# Patient Record
Sex: MALE | Race: WHITE | NOT HISPANIC OR LATINO | Employment: UNEMPLOYED | URBAN - METROPOLITAN AREA
[De-identification: names, ages, dates, MRNs, and addresses within clinical notes are randomized per-mention and may not be internally consistent; named-entity substitution may affect disease eponyms.]

---
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
## 2025-01-02 PROCEDURE — RXMED WILLOW AMBULATORY MEDICATION CHARGE

## 2025-01-03 ENCOUNTER — PHARMACY VISIT (OUTPATIENT)
Dept: PHARMACY | Facility: CLINIC | Age: 20
End: 2025-01-03
Payer: COMMERCIAL

## 2025-01-08 PROCEDURE — RXMED WILLOW AMBULATORY MEDICATION CHARGE

## 2025-01-10 ENCOUNTER — PHARMACY VISIT (OUTPATIENT)
Dept: PHARMACY | Facility: CLINIC | Age: 20
End: 2025-01-10
Payer: COMMERCIAL

## 2025-01-10 ENCOUNTER — APPOINTMENT (OUTPATIENT)
Dept: PEDIATRIC ENDOCRINOLOGY | Facility: CLINIC | Age: 20
End: 2025-01-10
Payer: COMMERCIAL

## 2025-01-10 VITALS
HEIGHT: 70 IN | WEIGHT: 268.74 LBS | TEMPERATURE: 96.9 F | DIASTOLIC BLOOD PRESSURE: 82 MMHG | SYSTOLIC BLOOD PRESSURE: 132 MMHG | BODY MASS INDEX: 38.47 KG/M2 | HEART RATE: 109 BPM

## 2025-01-10 DIAGNOSIS — E11.9 TYPE 2 DIABETES, HBA1C GOAL < 7% (MULTI): ICD-10-CM

## 2025-01-10 LAB — POC HEMOGLOBIN A1C: 7.8 % (ref 4.2–6.5)

## 2025-01-10 PROCEDURE — 99215 OFFICE O/P EST HI 40 MIN: CPT | Performed by: PEDIATRICS

## 2025-01-10 PROCEDURE — 3079F DIAST BP 80-89 MM HG: CPT | Performed by: PEDIATRICS

## 2025-01-10 PROCEDURE — 3008F BODY MASS INDEX DOCD: CPT | Performed by: PEDIATRICS

## 2025-01-10 PROCEDURE — 83036 HEMOGLOBIN GLYCOSYLATED A1C: CPT | Performed by: PEDIATRICS

## 2025-01-10 PROCEDURE — 3075F SYST BP GE 130 - 139MM HG: CPT | Performed by: PEDIATRICS

## 2025-01-10 RX ORDER — TIRZEPATIDE 10 MG/.5ML
10 INJECTION, SOLUTION SUBCUTANEOUS
Qty: 2 ML | Refills: 3 | Status: SHIPPED | OUTPATIENT
Start: 2025-01-10 | End: 2025-01-10 | Stop reason: ALTCHOICE

## 2025-01-10 NOTE — PATIENT INSTRUCTIONS
It was nice to see you today Bartolo, A1C is 7.8%, down from 10.2% - great job!    PLAN:  -STOP insulin  -Continue Mounjaro 7.5mg  - Restart Jardiance  -work on increasing exercise  -due for labs  -Please call the office if BG is consistently above 180 or below 80  - USE [employer] Billing Phone Number: [phone]    Follow up in 3 months    Pediatric Endocrinology Office Info:  Mon-Fri 8:30am-5pm: [phone]  After 5pm, weekends, holidays: [phone]  [email or web address]    Please do not send MyChart Messages for urgent matters

## 2025-01-10 NOTE — PROGRESS NOTES
[facility]  Pediatric Diabetes Center    Subjective   Bartolo Connolly Jr. is a 19 y.o. male with type 2 diabetes.   Today Bartolo presents to clinic with his mother.     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 nurse, wants to work with computers after graduating  -lives at home with mom and brother     Insulin Injections/Pump sites:   - Not giving mealtime insulin  - Site rotation: stomach     Carbohydrate counting/diet:   - Not counting  - BF: typically skips, some mornings have coffee with cream and sugar  - Lunch: sandwich, noodles  - Dinner: whatever is at the house, chicken, porkchops  - Mostly drinking water or diet pop     Other:   Hypoglycemia:  - Not having any lows that are needing to be treated  -If he feels low, will eat something  Checks ketones with: BG high     Exercise:   -not doing any exercise, watching kids and chasing after them     Education Reviewed:   -exercise, insulin, jardiance     Goals        Help patients manage type 2 diabetes      Work on increasing exercise amount, at least 30-60 minutes of moderate exercise that makes you sweat!              Diabetes  Date of Diabetes Diagnosis: 03/01/20  Type of Diabetes: Type 2    Insulin Delivery  Diabetes Management Regimen: MDI  Using Smart Pen device: No  Average number of bolus insulin doses per day: 0    Glucose Monitoring  How do you primarily monitor blood sugars?: Meter  Glucose average  "(mg/dL): 174  Average number of blood glucose checks per day: 0.6    Clinical Details  Hypoglycemia Unawareness : No  Severe Hypoglycemia (coma, seizure, disorientation, or the need for high dose glucagon) since last visit: No    Hospitalizations (since last endocrine appointment)  ED/Hospitalizations related to Diabetes: No  ED/Hospitalization not related to Diabetes: No  ED/Hospitalization related to DKA: No    Education  Comprehensive Diabetes Education : 03/01/20    Screens  Eye Exam:  (scheduled in April)  Labs:  (DUE)  Flu Shot: Not Applicable  Depression Screen: Yes  Depression Screen Date: 09/30/24  Counseling: Not applicable         Review of Systems   All other systems reviewed and are negative.      Objective   /82 (BP Location: Right arm, Patient Position: Sitting, BP Cuff Size: Large adult)   Pulse 109   Temp 36.1 °C (96.9 °F) (Temporal)   Ht 1.786 m (5' 10.32\")   Wt 122 kg (268 lb 11.9 oz)   BMI 38.22 kg/m²      Physical Exam     Lab  Lab Results   Component Value Date    HGBA1C 7.8 (A) 01/10/2025    HGBA1C 10.2 (A) 09/30/2024    HGBA1C 9.9 (A) 07/22/2024    HGBA1C 11.2 (A) 05/21/2024       Assessment/Plan     started taking Jardiance after last appt, took for a couple weeks and having lows so they stopped the Jardiance      Patient Instructions   It was nice to see you today Bartolo, A1C is 7.8%, down from 10.2% - great job!    PLAN:  -STOP insulin  -Continue Mounjaro 7.5mg  - Restart Jardiance  -work on increasing exercise  -due for labs  -Please call the office if BG is consistently above 180 or below 80     Billing Phone Number: [phone]    Follow up in 3 months    Pediatric Endocrinology Office Info:  Mon-Fri 8:30am-5pm: [phone]  After 5pm, weekends, holidays: [phone]  [email or web address]    Please do not send MyChart Messages for urgent matters           Insulin Instructions  Fixed Dose Injections   Last edited by Chaparrita Villareal RN on 11/27/2023 at 11:00 AM    "   Time of Day Dose (units)   8am 60     Mealtime Injections   insulin lispro 100 unit/mL injection (HumaLOG)   Last edited by Chaparrita Villareal RN on 7/22/2024 at 12:19 PM      If not eating and BG>200, take 5 units  If eating, take 10 units         Chaparrita Villareal RN

## 2025-01-13 PROCEDURE — RXMED WILLOW AMBULATORY MEDICATION CHARGE

## 2025-01-13 RX ORDER — TIRZEPATIDE 7.5 MG/.5ML
7.5 INJECTION, SOLUTION SUBCUTANEOUS
Qty: 2 ML | Refills: 3 | Status: SHIPPED | OUTPATIENT
Start: 2025-01-13

## 2025-01-17 ENCOUNTER — PHARMACY VISIT (OUTPATIENT)
Dept: PHARMACY | Facility: CLINIC | Age: 20
End: 2025-01-17
Payer: COMMERCIAL

## 2025-01-24 PROCEDURE — RXMED WILLOW AMBULATORY MEDICATION CHARGE

## 2025-01-27 PROCEDURE — RXMED WILLOW AMBULATORY MEDICATION CHARGE

## 2025-01-28 ENCOUNTER — PHARMACY VISIT (OUTPATIENT)
Dept: PHARMACY | Facility: CLINIC | Age: 20
End: 2025-01-28
Payer: COMMERCIAL

## 2025-01-29 PROCEDURE — RXMED WILLOW AMBULATORY MEDICATION CHARGE

## 2025-01-30 ENCOUNTER — PHARMACY VISIT (OUTPATIENT)
Dept: PHARMACY | Facility: CLINIC | Age: 20
End: 2025-01-30
Payer: COMMERCIAL

## 2025-02-13 PROCEDURE — RXMED WILLOW AMBULATORY MEDICATION CHARGE

## 2025-02-17 ENCOUNTER — PHARMACY VISIT (OUTPATIENT)
Dept: PHARMACY | Facility: CLINIC | Age: 20
End: 2025-02-17
Payer: COMMERCIAL

## 2025-02-18 ENCOUNTER — PHARMACY VISIT (OUTPATIENT)
Dept: PHARMACY | Facility: CLINIC | Age: 20
End: 2025-02-18

## 2025-02-25 PROCEDURE — RXMED WILLOW AMBULATORY MEDICATION CHARGE

## 2025-02-26 ENCOUNTER — PHARMACY VISIT (OUTPATIENT)
Dept: PHARMACY | Facility: CLINIC | Age: 20
End: 2025-02-26
Payer: COMMERCIAL

## 2025-03-13 PROCEDURE — RXMED WILLOW AMBULATORY MEDICATION CHARGE

## 2025-03-14 ENCOUNTER — PHARMACY VISIT (OUTPATIENT)
Dept: PHARMACY | Facility: CLINIC | Age: 20
End: 2025-03-14
Payer: COMMERCIAL

## 2025-03-27 PROCEDURE — RXMED WILLOW AMBULATORY MEDICATION CHARGE

## 2025-03-31 ENCOUNTER — PHARMACY VISIT (OUTPATIENT)
Dept: PHARMACY | Facility: CLINIC | Age: 20
End: 2025-03-31
Payer: COMMERCIAL

## 2025-04-07 PROCEDURE — RXMED WILLOW AMBULATORY MEDICATION CHARGE

## 2025-04-10 ENCOUNTER — PHARMACY VISIT (OUTPATIENT)
Dept: PHARMACY | Facility: CLINIC | Age: 20
End: 2025-04-10
Payer: COMMERCIAL

## 2025-04-11 ENCOUNTER — NUTRITION (OUTPATIENT)
Dept: PEDIATRIC ENDOCRINOLOGY | Facility: CLINIC | Age: 20
End: 2025-04-11

## 2025-04-11 ENCOUNTER — APPOINTMENT (OUTPATIENT)
Dept: PEDIATRIC ENDOCRINOLOGY | Facility: CLINIC | Age: 20
End: 2025-04-11
Payer: COMMERCIAL

## 2025-04-11 VITALS
HEIGHT: 70 IN | TEMPERATURE: 97.1 F | WEIGHT: 260.14 LBS | HEART RATE: 76 BPM | BODY MASS INDEX: 37.24 KG/M2 | SYSTOLIC BLOOD PRESSURE: 117 MMHG | DIASTOLIC BLOOD PRESSURE: 75 MMHG

## 2025-04-11 DIAGNOSIS — E11.9 TYPE 2 DIABETES, HBA1C GOAL < 7% (MULTI): ICD-10-CM

## 2025-04-11 LAB — POC HEMOGLOBIN A1C: 10.9 % (ref 4.2–6.5)

## 2025-04-11 PROCEDURE — RXMED WILLOW AMBULATORY MEDICATION CHARGE

## 2025-04-11 PROCEDURE — 3078F DIAST BP <80 MM HG: CPT | Performed by: PEDIATRICS

## 2025-04-11 PROCEDURE — 83036 HEMOGLOBIN GLYCOSYLATED A1C: CPT | Performed by: PEDIATRICS

## 2025-04-11 PROCEDURE — 3074F SYST BP LT 130 MM HG: CPT | Performed by: PEDIATRICS

## 2025-04-11 PROCEDURE — 95251 CONT GLUC MNTR ANALYSIS I&R: CPT | Performed by: PEDIATRICS

## 2025-04-11 PROCEDURE — 3046F HEMOGLOBIN A1C LEVEL >9.0%: CPT | Performed by: PEDIATRICS

## 2025-04-11 PROCEDURE — 99214 OFFICE O/P EST MOD 30 MIN: CPT | Performed by: PEDIATRICS

## 2025-04-11 PROCEDURE — 3008F BODY MASS INDEX DOCD: CPT | Performed by: PEDIATRICS

## 2025-04-11 RX ORDER — TIRZEPATIDE 10 MG/.5ML
10 INJECTION, SOLUTION SUBCUTANEOUS
Qty: 2 ML | Refills: 4 | Status: SHIPPED | OUTPATIENT
Start: 2025-04-11

## 2025-04-11 ASSESSMENT — ENCOUNTER SYMPTOMS
ACTIVITY CHANGE: 0
CONSTIPATION: 0
ABDOMINAL PAIN: 0
APPETITE CHANGE: 0
NAUSEA: 0
HEADACHES: 0
VOMITING: 0
DIARRHEA: 0

## 2025-04-11 NOTE — PROGRESS NOTES
"Reason for Nutrition Visit:  Pt is a 19 y.o. male being seen for T2DM    24 Diet Recall:  Meal 1: 10-12 wakes   Meal 2: 12-1 - sandwiches - ham + morales (2) + chips + water   Meal 3: 7 - potatoes + grilled chicken (1 plate)   Bedtime varies - 10-2   No snacks   Will walk when not raining  Eating half plate   Activity: chasing kids around     No Known Allergies    Anthropometrics:   Recent Vitals     1/10/2025  1106 4/11/2025  1011 4/11/2025  1100 Most Recent Value   BP: 132/82 139/78 117/75 117/75  as of 4/11/2025   Height: 1.786 m (5' 10.32\") 1.786 m (5' 10.32\") -- 1.786 m (5' 10.32\")  as of 4/11/2025   Percentile: 61%, Z= 0.27* 60%, Z= 0.26*  60%, Z= 0.26*   Weight: 122 kg (268 lb 11.9 oz) 118 kg (260 lb 2.3 oz) -- 118 kg (260 lb 2.3 oz)  as of 4/11/2025   Percentile: >99%, Z= 2.68* >99%, Z= 2.57*  >99%, Z= 2.57*   Body Mass Index:    36.99 kg/m² Abnormal  (98%, Z= 2.14)*        Wt Readings from Last 4 Encounters:   04/11/25 118 kg (260 lb 2.3 oz) (>99%, Z= 2.57)*   01/10/25 122 kg (268 lb 11.9 oz) (>99%, Z= 2.68)*   09/30/24 118 kg (260 lb 9.3 oz) (>99%, Z= 2.58)*   07/22/24 123 kg (270 lb 11.6 oz) (>99%, Z= 2.71)*     * Growth percentiles are based on CDC (Boys, 2-20 Years) data.     Lab Results   Component Value Date    HGBA1C 9.3 (H) 04/08/2025    HGBA1C 7.8 (A) 01/10/2025    CHOL 204 (H) 11/27/2023    LDLF 69 11/15/2022    TRIG 188 (H) 11/27/2023      Results for orders placed or performed in visit on 01/10/25   POCT glycosylated hemoglobin (Hb A1C) manually resulted    Collection Time: 01/10/25 11:30 AM   Result Value Ref Range    POC HEMOGLOBIN A1c 7.8 (A) 4.2 - 6.5 %     Medications:   Current Outpatient Medications on File Prior to Visit   Medication Sig Dispense Refill    acetone, urine, test (TRUEplus Ketone) strip Use to test urine ketones if BG>250 or with illness 50 strip 11    alcohol swabs (Alcohol Prep Pads) pads, medicated Use 4-6 times daily for insulin injections 200 each 11    Baqsimi 3 " "mg/actuation spray,non-aerosol Instill 3mg intranasally for severe hypoglycemia 2 each 3    blood sugar diagnostic (OneTouch Verio test strips) strip Use to test blood sugar 4-6 times daily 200 strip 11    blood-glucose meter misc Use as directed to test blood sugar 1 each 0    blood-glucose sensor (FreeStyle Fantasma 3 Plus Sensor) device Use as directed to monitor glucose. Change sensor every 15 days 2 each 11    dextrose 15 gram/33 gram gel in packet Take by mouth.      empagliflozin (Jardiance) 10 mg Take 1 tablet (10 mg) by mouth once daily. 30 tablet 11    glucose 4 gram chewable tablet Chew once daily as needed.      insulin degludec (Tresiba FlexTouch) 100 unit/mL (3 mL) pen Inject 60 Units under the skin once daily at bedtime. Take as directed per insulin instructions. 15 mL 11    lancets (OneTouch Delica Plus Lancet) 33 gauge misc Use to test blood sugar 4-6 times daily 200 each 11    tirzepatide (Mounjaro) 10 mg/0.5 mL pen injector Inject 10 mg under the skin every 7 days. 2 mL 4    [DISCONTINUED] BD Alondra 2nd Gen Pen Needle 32 gauge x 5/32\" needle Use for insulin injections 4-6 times daily 200 each 11    [DISCONTINUED] insulin lispro (HumaLOG) 100 unit/mL pen Inject up to 60 units daily via insulin scales 30 mL 11    [DISCONTINUED] tirzepatide (Mounjaro) 7.5 mg/0.5 mL pen injector Inject 7.5 mg under the skin every 7 days. 2 mL 3     No current facility-administered medications on file prior to visit.      Estimated Energy Needs: 1346-4564 calories/day     Nutrition Diagnosis:    Diagnosis Statement 1:  Diagnosis Status: Ongoing (weight loss recent)   Diagnosis : Overweight related to  imbalance between calorie intake and activity  as evidenced by weight trends     Nutrition Intervention:  Discussed diet and lifestyle as a good tool to manage diabetes.    Nutrition Goals:  Take a multivitamin.  Encouraged and discussed some protein sources.  Eat smaller amounts more often - avoid larger volume snacks.    "

## 2025-04-11 NOTE — PATIENT INSTRUCTIONS
It was nice to see you today Bartolo, A1C is 9.3%    PLAN:  Increase Mounjaro 10mg  Work on daily exercise  Continue to take Jardiance 10mg every day    Follow up in 3 months    Pediatric Endocrinology Office Info:  Mon-Fri 8:30am-5pm: [phone]  After 5pm, weekends, holidays: [phone]  [email or web address]    Please do not send MyChart Messages for urgent matters

## 2025-04-11 NOTE — PROGRESS NOTES
[facility]  Pediatric Diabetes Center    Subjective   Bartolo Connolly Jr. is a 19 y.o. male with type 2 diabetes.   Today Bartolo presents to clinic with his mother.     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 - wants to work with computers after graduating  -lives at home with mom and brother     Insulin Injections/Pump sites:   -  No mealtime insulin  - Site rotation: stomach     Carbohydrate counting:   - BF: sometimes, eggs, wilburn, hashbrown, sausage  - Lunch: sometimes skipping, sandwich, leftovers  - Dinner: cooking dinner  - Not snacking     Other:   Hypoglycemia:  - not having  - Nocturnal hypoglycemia: no  Checks ketones with: BG high     Exercise:   -chasing kids around     Education Reviewed:   -ketone testing, jardiance, exercise     Goals        Help patients manage type 2 diabetes      Work on increasing exercise amount, at least 30-60 minutes of moderate exercise that makes you sweat!              Diabetes  Date of Diabetes Diagnosis: 03/01/20  Type of Diabetes: Type 2    Insulin Delivery  Diabetes Management Regimen: Non-insulin medication    Glucose Monitoring  How do you primarily monitor blood sugars?: CGM  CGM Type: Freestyle Fantasma  Time in range 70-180mg/dL (%): 49  Time low <70mg/dL (%): 0  Time high >180mg/dL (%): 51  Average Glucose: 190  Predicted GMI: 7.9    Clinical Details  Hypoglycemia Unawareness : No  Severe Hypoglycemia (coma, seizure, disorientation, or the need for high dose glucagon) since last visit: No    Hospitalizations (since last endocrine appointment)  ED/Hospitalizations related to Diabetes: No  ED/Hospitalization not related to Diabetes: No  ED/Hospitalization related to DKA:  "No    Education  Comprehensive Diabetes Education : 03/01/20    Screens  Labs: 04/09/25  Eye Exam:  (has appointment schedueld in May)  Flu Shot: Patient declined  Depression Screen: Yes  Depression Screen Date: 09/30/24  Counseling: Not applicable         Review of Systems   Constitutional:  Negative for activity change and appetite change.   Respiratory:          Unsure if he snores   Gastrointestinal:  Negative for abdominal pain, constipation, diarrhea, nausea and vomiting.   Neurological:  Negative for headaches.       Objective   /75   Pulse 76   Temp 36.2 °C (97.1 °F)   Ht 1.786 m (5' 10.32\")   Wt 118 kg (260 lb 2.3 oz)   BMI 36.99 kg/m²      Physical Exam  Vitals reviewed.   Constitutional:       General: He is not in acute distress.  HENT:      Nose: No congestion.      Mouth/Throat:      Mouth: Mucous membranes are moist.   Eyes:      Extraocular Movements: Extraocular movements intact.      Conjunctiva/sclera: Conjunctivae normal.      Pupils: Pupils are equal, round, and reactive to light.   Cardiovascular:      Rate and Rhythm: Normal rate and regular rhythm.      Pulses: Normal pulses.      Heart sounds: No murmur heard.  Pulmonary:      Effort: Pulmonary effort is normal.      Breath sounds: Normal breath sounds.   Abdominal:      General: Abdomen is flat. Bowel sounds are normal. There is no distension.      Palpations: Abdomen is soft. There is no mass.      Tenderness: There is no abdominal tenderness.   Musculoskeletal:         General: Normal range of motion.      Cervical back: Normal range of motion and neck supple.   Lymphadenopathy:      Cervical: No cervical adenopathy.   Skin:     General: Skin is warm and dry.      Capillary Refill: Capillary refill takes less than 2 seconds.   Neurological:      General: No focal deficit present.      Mental Status: He is alert and oriented to person, place, and time.   Psychiatric:         Behavior: Behavior normal.          Lab  Lab Results "   Component Value Date    HGBA1C 10.9 (A) 04/11/2025    HGBA1C 9.3 (H) 04/08/2025    HGBA1C 7.8 (A) 01/10/2025    HGBA1C 10.2 (A) 09/30/2024       Assessment/Plan   Bartolo Connolly Jr. is a 19 y.o. male with (1) T2D diagnosed in 3/2020 with suboptimal glycemic control, managed with Fantasma 3, Mounjaro 7.5mg weekly and Jardiance 10mg daily, as well as (2) Class II obesity (% of the 95th precentile) - overall stable.     At his last visit in 1/2025, A1C was 7.8%, Jardiance was re-started.     Screening labs on 4/8/2025: LDLc 128mg/dL, , HDLc 40, Cr 0.7, ALT 28. ZnT8, insulin autoantibody, IA2 and islet cell Ab - all Negative. MICHELLE 65 not checked by the lab.  Urine alb/Cr <15.     Recommendations:  - Increase Mounjaro to 10mg weekly.  - Continue Jardiance 10mg daily  - Family to contact us in 4 weeks. If BG remains suboptimal, options are (1) add long acting insulin, (2) increase Jardiance and (3) increase Mounjaro --will decide based on BG trends, side effects etc.   - Eye exam as scheduled in April 2025      CGM Interpretation/Plan   14 day CGM download was reviewed in detail as documented above under GLUCOSE MONITORING and will be attached to chart.  A minimum of 72 hours of glucose data was used to inform the management plan outlined above.    Liseth Moran MD

## 2025-04-14 ENCOUNTER — PHARMACY VISIT (OUTPATIENT)
Dept: PHARMACY | Facility: CLINIC | Age: 20
End: 2025-04-14
Payer: COMMERCIAL

## 2025-04-21 PROCEDURE — RXMED WILLOW AMBULATORY MEDICATION CHARGE

## 2025-04-23 ENCOUNTER — PHARMACY VISIT (OUTPATIENT)
Dept: PHARMACY | Facility: CLINIC | Age: 20
End: 2025-04-23
Payer: COMMERCIAL

## 2025-04-24 ENCOUNTER — APPOINTMENT (OUTPATIENT)
Dept: OPHTHALMOLOGY | Facility: CLINIC | Age: 20
End: 2025-04-24
Payer: COMMERCIAL

## 2025-04-24 DIAGNOSIS — Z79.4 TYPE 2 DIABETES MELLITUS WITHOUT COMPLICATION, WITH LONG-TERM CURRENT USE OF INSULIN: ICD-10-CM

## 2025-04-24 DIAGNOSIS — H52.223 REGULAR ASTIGMATISM OF BOTH EYES: Primary | ICD-10-CM

## 2025-04-24 DIAGNOSIS — E11.9 TYPE 2 DIABETES MELLITUS WITHOUT COMPLICATION, WITH LONG-TERM CURRENT USE OF INSULIN: ICD-10-CM

## 2025-04-24 PROCEDURE — 92015 DETERMINE REFRACTIVE STATE: CPT | Performed by: OPTOMETRIST

## 2025-04-24 PROCEDURE — 92004 COMPRE OPH EXAM NEW PT 1/>: CPT | Performed by: OPTOMETRIST

## 2025-04-24 ASSESSMENT — CONF VISUAL FIELD
OS_NORMAL: 1
OS_INFERIOR_NASAL_RESTRICTION: 0
OD_INFERIOR_NASAL_RESTRICTION: 0
OD_SUPERIOR_NASAL_RESTRICTION: 0
OD_INFERIOR_TEMPORAL_RESTRICTION: 0
OD_SUPERIOR_TEMPORAL_RESTRICTION: 0
OS_SUPERIOR_NASAL_RESTRICTION: 0
OS_SUPERIOR_TEMPORAL_RESTRICTION: 0
OS_INFERIOR_TEMPORAL_RESTRICTION: 0
OD_NORMAL: 1
METHOD: COUNTING FINGERS

## 2025-04-24 ASSESSMENT — ENCOUNTER SYMPTOMS
GASTROINTESTINAL NEGATIVE: 0
PSYCHIATRIC NEGATIVE: 0
ENDOCRINE NEGATIVE: 1
CARDIOVASCULAR NEGATIVE: 0
HEMATOLOGIC/LYMPHATIC NEGATIVE: 0
ALLERGIC/IMMUNOLOGIC NEGATIVE: 0
MUSCULOSKELETAL NEGATIVE: 0
RESPIRATORY NEGATIVE: 0
NEUROLOGICAL NEGATIVE: 0
CONSTITUTIONAL NEGATIVE: 0
EYES NEGATIVE: 1

## 2025-04-24 ASSESSMENT — REFRACTION_MANIFEST
OS_AXIS: 169
OS_SPHERE: +0.25
OD_CYLINDER: -0.50
OS_AXIS: 170
METHOD_AUTOREFRACTION: 1
OD_CYLINDER: -0.25
OD_SPHERE: -0.25
OS_CYLINDER: -1.00
OD_AXIS: 178
OS_SPHERE: PLANO
OD_SPHERE: -0.50
OS_CYLINDER: -0.75
OD_AXIS: 180

## 2025-04-24 ASSESSMENT — REFRACTION
OD_AXIS: 178
OS_AXIS: 170
OS_CYLINDER: -0.75
OS_SPHERE: PLANO
OS_AXIS: 170
OD_CYLINDER: -0.50
OS_CYLINDER: -0.75
OD_SPHERE: PLANO
OD_CYLINDER: -0.75
OD_SPHERE: -0.25
OS_SPHERE: PLANO
OD_AXIS: 178

## 2025-04-24 ASSESSMENT — TONOMETRY
OD_IOP_MMHG: 18
OS_IOP_MMHG: 20
IOP_METHOD: GOLDMANN APPLANATION

## 2025-04-24 ASSESSMENT — SLIT LAMP EXAM - LIDS
COMMENTS: NORMAL
COMMENTS: NORMAL

## 2025-04-24 ASSESSMENT — VISUAL ACUITY
OD_SC+: -2
OS_SC+: -1
OS_SC: 20/20
METHOD: SNELLEN - LINEAR
OD_SC: 20/20

## 2025-04-24 ASSESSMENT — EXTERNAL EXAM - RIGHT EYE: OD_EXAM: NORMAL

## 2025-04-24 ASSESSMENT — CUP TO DISC RATIO
OS_RATIO: 0.1
OD_RATIO: 0.1

## 2025-04-24 ASSESSMENT — EXTERNAL EXAM - LEFT EYE: OS_EXAM: NORMAL

## 2025-04-24 NOTE — PROGRESS NOTES
Assessment/Plan   Diagnoses and all orders for this visit:  Regular astigmatism of both eyes  New spec rx released today per patient request. Ocular health wnl for age OU. Monitor 1 year or sooner prn. Refraction billed today. Pt consents to receiving glasses Rx today. Patient's/guardian's signature obtained to acknowledge and confirm that a paper copy of glasses Rx was given to patient in compliance with [facility] Eyeglass Rule. Electronic copy of Rx will also be available via [employer]. Discussed glasses optional given minimal Rx and good scVA.     Type 2 diabetes mellitus without complication, with long-term current use of insulin  The patient has diabetes without any evidence of retinopathy.  The patient was advised to maintain tight glucose control, tight blood pressure control, and favorable levels of cholesterol, low density lipoprotein, and high density lipoproteins.  Follow up in one year was recommended.       RTC 1 year for DFE and MRX or sooner if any complications arise.

## 2025-04-28 DIAGNOSIS — E11.9 TYPE 2 DIABETES, HBA1C GOAL < 7% (MULTI): ICD-10-CM

## 2025-04-28 RX ORDER — INSULIN DEGLUDEC 100 U/ML
60 INJECTION, SOLUTION SUBCUTANEOUS NIGHTLY
Qty: 15 ML | Refills: 11 | OUTPATIENT
Start: 2025-04-28 | End: 2026-04-28

## 2025-05-12 ENCOUNTER — APPOINTMENT (OUTPATIENT)
Dept: PEDIATRIC ENDOCRINOLOGY | Facility: CLINIC | Age: 20
End: 2025-05-12
Payer: COMMERCIAL

## 2025-05-12 VITALS
WEIGHT: 261.69 LBS | TEMPERATURE: 97.3 F | HEART RATE: 80 BPM | OXYGEN SATURATION: 94 % | DIASTOLIC BLOOD PRESSURE: 79 MMHG | SYSTOLIC BLOOD PRESSURE: 128 MMHG | BODY MASS INDEX: 36.64 KG/M2 | HEIGHT: 71 IN

## 2025-05-12 DIAGNOSIS — E11.9 TYPE 2 DIABETES, HBA1C GOAL < 7% (MULTI): ICD-10-CM

## 2025-05-12 DIAGNOSIS — E11.9 TYPE 2 DIABETES MELLITUS WITHOUT COMPLICATIONS: ICD-10-CM

## 2025-05-12 LAB — POC HEMOGLOBIN A1C: 9.9 % (ref 4.2–6.5)

## 2025-05-12 PROCEDURE — 83036 HEMOGLOBIN GLYCOSYLATED A1C: CPT | Performed by: PEDIATRICS

## 2025-05-12 PROCEDURE — 3046F HEMOGLOBIN A1C LEVEL >9.0%: CPT | Performed by: PEDIATRICS

## 2025-05-12 PROCEDURE — 3008F BODY MASS INDEX DOCD: CPT | Performed by: PEDIATRICS

## 2025-05-12 PROCEDURE — 99215 OFFICE O/P EST HI 40 MIN: CPT | Performed by: PEDIATRICS

## 2025-05-12 PROCEDURE — RXMED WILLOW AMBULATORY MEDICATION CHARGE

## 2025-05-12 PROCEDURE — 3078F DIAST BP <80 MM HG: CPT | Performed by: PEDIATRICS

## 2025-05-12 PROCEDURE — 3074F SYST BP LT 130 MM HG: CPT | Performed by: PEDIATRICS

## 2025-05-12 RX ORDER — BLOOD-GLUCOSE SENSOR
EACH MISCELLANEOUS
Qty: 2 EACH | Refills: 11 | Status: SHIPPED | OUTPATIENT
Start: 2025-05-12 | End: 2025-05-13 | Stop reason: SDUPTHER

## 2025-05-12 RX ORDER — LANCETS 33 GAUGE
EACH MISCELLANEOUS
Qty: 200 EACH | Refills: 11 | Status: SHIPPED | OUTPATIENT
Start: 2025-05-12 | End: 2025-05-13 | Stop reason: SDUPTHER

## 2025-05-12 RX ORDER — BLOOD-GLUCOSE METER
EACH MISCELLANEOUS
Qty: 200 STRIP | Refills: 11 | Status: SHIPPED | OUTPATIENT
Start: 2025-05-12 | End: 2025-05-13 | Stop reason: SDUPTHER

## 2025-05-12 RX ORDER — BLOOD SUGAR DIAGNOSTIC
STRIP MISCELLANEOUS
Qty: 50 STRIP | Refills: 11 | Status: SHIPPED | OUTPATIENT
Start: 2025-05-12 | End: 2025-05-13 | Stop reason: SDUPTHER

## 2025-05-12 RX ORDER — IBUPROFEN 200 MG
TABLET ORAL
Qty: 50 TABLET | Refills: 11 | Status: SHIPPED | OUTPATIENT
Start: 2025-05-12 | End: 2025-05-13 | Stop reason: SDUPTHER

## 2025-05-12 RX ORDER — ISOPROPYL ALCOHOL 70 ML/100ML
SWAB TOPICAL
Qty: 200 EACH | Refills: 11 | Status: SHIPPED | OUTPATIENT
Start: 2025-05-12 | End: 2025-05-13 | Stop reason: SDUPTHER

## 2025-05-12 RX ORDER — TIRZEPATIDE 7.5 MG/.5ML
7.5 INJECTION, SOLUTION SUBCUTANEOUS
Qty: 2 ML | Refills: 11 | Status: SHIPPED | OUTPATIENT
Start: 2025-05-12

## 2025-05-12 RX ORDER — PIOGLITAZONE 15 MG/1
15 TABLET ORAL DAILY
Qty: 30 TABLET | Refills: 11 | Status: SHIPPED | OUTPATIENT
Start: 2025-05-12 | End: 2026-05-12

## 2025-05-12 RX ORDER — DEXTROSE 15 G/33 G
GEL IN PACKET (GRAM) ORAL
Qty: 15 G | Refills: 11 | Status: SHIPPED | OUTPATIENT
Start: 2025-05-12 | End: 2025-05-14

## 2025-05-12 NOTE — PATIENT INSTRUCTIONS
It was nice to see you today Bartolo, A1C is 9.9%    PLAN:  -Decrease Mounjaro dose back to 7.5mg  -Please take Jardiance every day  -Going to start pioglitazone 15mg  -Work on increasing exercise daily   -If not wearing Fantasma, check blood sugars first thing in the morning and 2 hours after biggest meal    Follow up in 2 months    Pediatric Endocrinology Office Info:  Mon-Fri 8:30am-5pm: [phone]  After 5pm, weekends, holidays: [phone]  [email or web address]    Please do not send MyChart Messages for urgent matters

## 2025-05-12 NOTE — PROGRESS NOTES
[facility]  Pediatric Diabetes Center    Subjective   Bartolo Connolly Jr. is a 19 y.o. male with type 2 diabetes.   Today Bartolo presents to clinic with his mother.     HPI  Here for diabetes follow up  Last visit 4/11/25, A1C 10.9%    Other Medical History: none reported     Manages diabetes with   Libre3  Mounjaro 10 mg weekly on Sundays - has been having increased diarrhea since starting 10mg dose. Forgot to take dose yesterday  Jardiance 10mg - missing some doses, maybe only taking every other day     Concerns at this visit:   -diarrhea with increased dose of mounjaro    Blood sugars:  -last fantasma sensor bled and didn't read. 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  - Site rotation: stomach     Carbohydrate counting:    - BF: sometimes, eggs, wilburn, hashbrown, sausage  - Lunch: sometimes skipping, sandwich, leftovers  - Dinner: cooking dinner  - Not snacking  - Water     Other:   Hypoglycemia:  - not having any lows     Exercise:   -babysitting, going to start walking      Education Reviewed:   -exercise, type 2 medications     Goals        Help patients manage type 2 diabetes      Work on increasing exercise amount, at least 30-60 minutes of moderate exercise that makes you sweat!              Diabetes  Date of Diabetes Diagnosis: 03/01/20  Type of Diabetes: Type 2    Insulin Delivery  Diabetes Management Regimen: Non-insulin medication    Glucose Monitoring  How do you primarily monitor blood sugars?: Not monitoring (Restarted Fantasma 5/12 before appointment. No data since beginning of April.)  CGM Type: Freestyle Fantasma    Clinical Details  Hypoglycemia Unawareness : No  Severe Hypoglycemia (coma, seizure, disorientation, or the need for  "high dose glucagon) since last visit: No    Hospitalizations (since last endocrine appointment)  ED/Hospitalizations related to Diabetes: No  ED/Hospitalization not related to Diabetes: No  ED/Hospitalization related to DKA: No    Education  Comprehensive Diabetes Education : 03/01/20    Screens  Labs: 04/09/25  Eye Exam: Yes  Eye Exam Date: 04/24/25  Flu Shot: Patient declined  Depression Screen: Yes  Depression Screen Date: 09/30/24  Counseling: Not applicable    Severity measure for depression (PHQ9 for Adolescents-Adapted) Total or Prorated Raw Score= No data recorded No data recorded     Severity of Depressive disorder or episode falls under the following category, with plan:  None (0): Will follow up with PHQ-A in one year           Review of Systems    Objective   /79 (BP Location: Right arm, Patient Position: Sitting, BP Cuff Size: Large adult)   Pulse 80   Temp 36.3 °C (97.3 °F)   Ht 1.795 m (5' 10.67\")   Wt 119 kg (261 lb 11 oz)   SpO2 94%   BMI 36.84 kg/m²      Physical Exam     Lab  Lab Results   Component Value Date    HGBA1C 9.9 (A) 05/12/2025    HGBA1C 10.9 (A) 04/11/2025    HGBA1C 9.3 (H) 04/08/2025    HGBA1C 7.8 (A) 01/10/2025       Assessment/Plan   Bartolo Connolly Jr. is a 19 y.o. male with with Obesity and T2 Diabetes since 2020  and has not developed any diabetes complications (no proteinuria, no retinopathy) thus far.   Weight is  stable in the last year despite A1C increase  Diabetes is treated with  Mounjaro 10 mg (has taken only 2 doses at this dose and reports diarrhea)  and Jardiance  (takes about every other day) Reports challenges with adherence with the treatment plan. Did not tolerate metformin in the past   Very sedentary  A1C is 9.9% significantly above target and has gone  up since last significantly.     Co-morbidities include:  HTN: none  Dyslipidemia :present ZQO=701, 4/25  NEWSOME:  none  Sleep apnea: no concerns    No glycemic data available.    - discussed adherence " with Jardiance  - Will decrease Mounjaro to 7.5mg given diarrhea  - Will prescribe Pioglitazone at 15 mg to avoid the need for insulin: discussed cons and pros  - Will Re-evaluate in 2 mos, if no improvement, will have to restart long acting insulin   - asked to wear a sensor     Patient is up-to-date with annual surveillance tests (Urine albumin, lipid panel, CMP)   Patient is up-to-date with an eye exam    Topics reviewed:  - benefits of physical activity: gym membership, exercise apps     Follow up in 2 mos         Plan:    Problem List Items Addressed This Visit    None  Visit Diagnoses         Type 2 diabetes, HbA1c goal < 7% (Multi)        Relevant Medications    lancets (OneTouch Delica Plus Lancet) 33 gauge misc    glucose 4 gram chewable tablet    empagliflozin (Jardiance) 10 mg tablet    dextrose 15 gram/33 gram gel in packet    blood-glucose sensor (FreeStyle Fantasma 3 Plus Sensor) device    blood sugar diagnostic (OneTouch Verio test strips)    alcohol swabs (Alcohol Prep Pads)    acetone, urine, test (TRUEplus Ketone) strip    tirzepatide (Mounjaro) 7.5 mg/0.5 mL pen injector    pioglitazone (Actos) 15 mg tablet      Type 2 diabetes mellitus without complications        Relevant Medications    alcohol swabs (Alcohol Prep Pads)    acetone, urine, test (TRUEplus Ketone) strip            Chaparrita Spain RN

## 2025-05-13 ENCOUNTER — PHARMACY VISIT (OUTPATIENT)
Dept: PHARMACY | Facility: CLINIC | Age: 20
End: 2025-05-13
Payer: COMMERCIAL

## 2025-05-13 DIAGNOSIS — E11.9 TYPE 2 DIABETES MELLITUS WITHOUT COMPLICATIONS: ICD-10-CM

## 2025-05-13 DIAGNOSIS — E11.9 TYPE 2 DIABETES, HBA1C GOAL < 7% (MULTI): ICD-10-CM

## 2025-05-13 RX ORDER — BLOOD SUGAR DIAGNOSTIC
STRIP MISCELLANEOUS
Qty: 50 STRIP | Refills: 11 | Status: SHIPPED | OUTPATIENT
Start: 2025-05-13

## 2025-05-13 RX ORDER — ISOPROPYL ALCOHOL 70 ML/100ML
SWAB TOPICAL
Qty: 200 EACH | Refills: 11 | Status: SHIPPED | OUTPATIENT
Start: 2025-05-13

## 2025-05-13 RX ORDER — LANCETS 33 GAUGE
EACH MISCELLANEOUS
Qty: 200 EACH | Refills: 11 | Status: SHIPPED | OUTPATIENT
Start: 2025-05-13

## 2025-05-13 RX ORDER — BLOOD-GLUCOSE SENSOR
EACH MISCELLANEOUS
Qty: 2 EACH | Refills: 11 | Status: SHIPPED | OUTPATIENT
Start: 2025-05-13

## 2025-05-13 RX ORDER — ADHESIVE BANDAGE 7/8"
BANDAGE TOPICAL
Qty: 50 TABLET | Refills: 11 | Status: SHIPPED | OUTPATIENT
Start: 2025-05-13

## 2025-05-13 RX ORDER — BLOOD-GLUCOSE METER
EACH MISCELLANEOUS
Qty: 200 STRIP | Refills: 11 | Status: SHIPPED | OUTPATIENT
Start: 2025-05-13

## 2025-05-14 ENCOUNTER — PHARMACY VISIT (OUTPATIENT)
Dept: PHARMACY | Facility: CLINIC | Age: 20
End: 2025-05-14

## 2025-05-14 RX ORDER — DEXTROSE 40 %
GEL (GRAM) ORAL
Qty: 37.5 G | Refills: 11 | Status: SHIPPED | OUTPATIENT
Start: 2025-05-14

## 2025-05-19 PROCEDURE — RXMED WILLOW AMBULATORY MEDICATION CHARGE

## 2025-05-21 ENCOUNTER — PHARMACY VISIT (OUTPATIENT)
Dept: PHARMACY | Facility: CLINIC | Age: 20
End: 2025-05-21
Payer: COMMERCIAL

## 2025-05-21 PROCEDURE — RXMED WILLOW AMBULATORY MEDICATION CHARGE

## 2025-06-02 PROCEDURE — RXMED WILLOW AMBULATORY MEDICATION CHARGE

## 2025-06-04 PROCEDURE — RXMED WILLOW AMBULATORY MEDICATION CHARGE

## 2025-06-05 ENCOUNTER — PHARMACY VISIT (OUTPATIENT)
Dept: PHARMACY | Facility: CLINIC | Age: 20
End: 2025-06-05
Payer: COMMERCIAL

## 2025-06-06 ENCOUNTER — PHARMACY VISIT (OUTPATIENT)
Dept: PHARMACY | Facility: CLINIC | Age: 20
End: 2025-06-06
Payer: COMMERCIAL

## 2025-06-06 PROCEDURE — RXMED WILLOW AMBULATORY MEDICATION CHARGE

## 2025-06-06 RX ORDER — DEXTROSE 4 G
TABLET,CHEWABLE ORAL
Qty: 1 EACH | Refills: 0 | OUTPATIENT
Start: 2025-06-06

## 2025-06-07 ENCOUNTER — PHARMACY VISIT (OUTPATIENT)
Dept: PHARMACY | Facility: CLINIC | Age: 20
End: 2025-06-07

## 2025-06-10 ENCOUNTER — PHARMACY VISIT (OUTPATIENT)
Dept: PHARMACY | Facility: CLINIC | Age: 20
End: 2025-06-10
Payer: COMMERCIAL

## 2025-07-07 ENCOUNTER — NUTRITION (OUTPATIENT)
Dept: PEDIATRIC ENDOCRINOLOGY | Facility: CLINIC | Age: 20
End: 2025-07-07

## 2025-07-07 ENCOUNTER — APPOINTMENT (OUTPATIENT)
Dept: PEDIATRIC ENDOCRINOLOGY | Facility: CLINIC | Age: 20
End: 2025-07-07
Payer: COMMERCIAL

## 2025-07-07 VITALS
HEART RATE: 101 BPM | SYSTOLIC BLOOD PRESSURE: 132 MMHG | DIASTOLIC BLOOD PRESSURE: 72 MMHG | BODY MASS INDEX: 36.63 KG/M2 | HEIGHT: 70 IN | TEMPERATURE: 96.9 F | WEIGHT: 255.84 LBS

## 2025-07-07 DIAGNOSIS — E11.9 TYPE 2 DIABETES, HBA1C GOAL < 7% (MULTI): Primary | ICD-10-CM

## 2025-07-07 LAB — POC HEMOGLOBIN A1C: 10.4 % (ref 4.2–6.5)

## 2025-07-07 PROCEDURE — 83036 HEMOGLOBIN GLYCOSYLATED A1C: CPT | Performed by: PEDIATRICS

## 2025-07-07 PROCEDURE — 99214 OFFICE O/P EST MOD 30 MIN: CPT | Performed by: PEDIATRICS

## 2025-07-07 PROCEDURE — 3008F BODY MASS INDEX DOCD: CPT | Performed by: PEDIATRICS

## 2025-07-07 PROCEDURE — 3046F HEMOGLOBIN A1C LEVEL >9.0%: CPT | Performed by: PEDIATRICS

## 2025-07-07 PROCEDURE — 3078F DIAST BP <80 MM HG: CPT | Performed by: PEDIATRICS

## 2025-07-07 PROCEDURE — 3075F SYST BP GE 130 - 139MM HG: CPT | Performed by: PEDIATRICS

## 2025-07-07 RX ORDER — TIRZEPATIDE 10 MG/.5ML
10 INJECTION, SOLUTION SUBCUTANEOUS
Qty: 2 ML | Refills: 6 | Status: SHIPPED | OUTPATIENT
Start: 2025-07-07

## 2025-07-07 NOTE — PROGRESS NOTES
"Reason for Nutrition Visit:  Pt is a 19 y.o. 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     Lab Results   Component Value Date    HGBA1C 9.9 (A) 05/12/2025    CHOL 204 (H) 11/27/2023    LDLCALC 127 (H) 11/27/2023    TRIG 188 (H) 11/27/2023      Results for orders placed or performed in visit on 05/12/25   POCT glycosylated hemoglobin (Hb A1C) manually resulted    Collection Time: 05/12/25  1:29 PM   Result Value Ref Range    POC HEMOGLOBIN A1c 9.9 (A) 4.2 - 6.5 %     Medications:   Medications Ordered Prior to Encounter[3]     Estimated Energy Needs: 2400 calories/day     Nutrition Diagnosis:    Diagnosis Statement 1:  Diagnosis Status: Ongoing  Diagnosis : Not ready for diet/lifestyle change related to patient more pre-contemplative as evidenced by diet history and reports of not working on lifestyle/ nutrition     Nutrition Intervention:  Encouraged adding fruit to breakfast.    Nutrition " Goals:  RDN available as needed to patient for nutrition education and support.       [1]   Patient Active Problem List  Diagnosis    Regular astigmatism of both eyes    Type 2 diabetes mellitus without complication, with long-term current use of insulin   [2] No Known Allergies  [3]   Current Outpatient Medications on File Prior to Visit   Medication Sig Dispense Refill    acetone, urine, test (TRUEplus Ketone) strip Use to test urine ketones if BG>250 or with illness 50 strip 11    alcohol swabs (Alcohol Prep Pads) Use 4-6 times daily for insulin injections 200 each 11    Baqsimi 3 mg/actuation spray,non-aerosol Instill 3mg intranasally for severe hypoglycemia 2 each 3    blood sugar diagnostic (OneTouch Verio test strips) Use to test blood sugar 4-7 times daily 200 strip 11    blood-glucose meter misc Use as directed to test blood sugar 1 each 0    blood-glucose meter misc Use to test blood sugar as directed 1 each 0    blood-glucose sensor (FreeStyle Fantasma 3 Plus Sensor) device Use as directed to monitor glucose. Change sensor every 15 days 2 each 11    empagliflozin (Jardiance) 10 mg tablet Take 1 tablet (10 mg) by mouth once daily. 30 tablet 11    glucose (Glutose-15) 40 % gel oral gel TAKE 15 GRAMS PO AS NEEDED FOR MODERATE HYPOGLYCEMIA 37.5 g 11    glucose 3.75 gram tablet,chewable chewable tablet Chew 3-4 tablets by mouth as needed for mild hypoglycemia 50 tablet 11    FreeStyle lancets 28 gauge Use to test blood sugar 4 to 7 times a day as instructed 200 each 11    pioglitazone (Actos) 15 mg tablet Take 1 tablet (15 mg) by mouth once daily. 30 tablet 11    tirzepatide (Mounjaro) 7.5 mg/0.5 mL pen injector Inject 7.5 mg under the skin every 7 days. 2 mL 11     No current facility-administered medications on file prior to visit.

## 2025-07-07 NOTE — PROGRESS NOTES
"[facility]  Pediatric Diabetes Center    Subjective   Bartolo Connolly Jr. is a 19 y.o. male with type 2 diabetes.   Today Bartolo presents to clinic with his mother.     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                                                      Review of Systems    Objective   /72 (BP Location: Right arm)   Pulse 101   Temp 36.1 °C (96.9 °F) (Temporal)   Ht 1.787 m (5' 10.35\")   Wt 116 kg (255 lb 13.5 oz)   BMI 36.34 kg/m²      Physical Exam  Constitutional:       Appearance: Normal appearance.   HENT:      Head: Normocephalic and atraumatic.      Nose: Nose normal.      Mouth/Throat:      Mouth: Mucous membranes are moist.     Eyes:      Extraocular Movements: Extraocular movements intact.      Conjunctiva/sclera: Conjunctivae normal.       Cardiovascular:      Rate and Rhythm: Normal rate and regular rhythm.      Heart sounds: Normal heart sounds.   Pulmonary:      Effort: Pulmonary effort is normal.      Breath sounds: Normal breath sounds.   Abdominal:      General: Bowel sounds are normal.      Palpations: Abdomen is soft.     Musculoskeletal:         General: Normal range of " motion.      Cervical back: Normal range of motion and neck supple.     Skin:     General: Skin is warm and dry.     Neurological:      General: No focal deficit present.      Mental Status: He is alert and oriented to person, place, and time.     Psychiatric:         Mood and Affect: Mood normal.         Behavior: Behavior normal.          Lab  Lab Results   Component Value Date    HGBA1C 10.4 (A) 07/07/2025    HGBA1C 9.9 (A) 05/12/2025    HGBA1C 10.9 (A) 04/11/2025    HGBA1C 9.3 (H) 04/08/2025       Assessment/Plan   Bartolo Connolly Jr. is a 19 y.o. male with type 2 diabetes. A1c increased today. Was to start pioglitazone last visit but did not start. On mounjaro and sglt2 inhibitor. In need of increased dose of tirzepatide for maximizing glycemic control Had some nausea previously but open to trying dose increase again to avoid insulin use. FU 6 weeks.    Glucose Monitoring: not using CGM    Plan:    Problem List Items Addressed This Visit    None  Visit Diagnoses         Codes      Type 2 diabetes, HbA1c goal < 7% (Multi)    -  Primary E11.9    Relevant Medications    tirzepatide (Mounjaro) 10 mg/0.5 mL pen injector    Other Relevant Orders    POCT glycosylated hemoglobin (Hb A1C) manually resulted (Completed)                     Ren Baxter MD

## 2025-07-07 NOTE — PATIENT INSTRUCTIONS
It was great to see you today, A1C 10.4%    PLAN  Mounjaro 10mg once weekly  Continue jardiance 10mg tablet once daily  Start pioglitazone 15mg once daily  Follow up in 6 weeks with Chaparrita SALAS  Call the office as needed with concerns or questions    [phone] weekdays 830-5pm  [phone] weekends or after 5pm weekdays  [email or web address]

## 2025-07-08 PROCEDURE — RXMED WILLOW AMBULATORY MEDICATION CHARGE

## 2025-07-09 ENCOUNTER — PHARMACY VISIT (OUTPATIENT)
Dept: PHARMACY | Facility: CLINIC | Age: 20
End: 2025-07-09
Payer: COMMERCIAL

## 2025-07-09 PROCEDURE — RXMED WILLOW AMBULATORY MEDICATION CHARGE

## 2025-07-11 ENCOUNTER — PHARMACY VISIT (OUTPATIENT)
Dept: PHARMACY | Facility: CLINIC | Age: 20
End: 2025-07-11
Payer: COMMERCIAL

## 2025-07-15 ENCOUNTER — APPOINTMENT (OUTPATIENT)
Dept: PEDIATRIC ENDOCRINOLOGY | Facility: CLINIC | Age: 20
End: 2025-07-15
Payer: COMMERCIAL

## 2025-08-01 PROCEDURE — RXMED WILLOW AMBULATORY MEDICATION CHARGE

## 2025-08-05 ENCOUNTER — PHARMACY VISIT (OUTPATIENT)
Dept: PHARMACY | Facility: CLINIC | Age: 20
End: 2025-08-05
Payer: COMMERCIAL

## 2025-08-06 ENCOUNTER — PHARMACY VISIT (OUTPATIENT)
Dept: PHARMACY | Facility: CLINIC | Age: 20
End: 2025-08-06
Payer: COMMERCIAL

## 2025-08-06 DIAGNOSIS — E11.9 TYPE 2 DIABETES, HBA1C GOAL < 7% (MULTI): ICD-10-CM

## 2025-08-06 PROCEDURE — RXMED WILLOW AMBULATORY MEDICATION CHARGE

## 2025-08-06 RX ORDER — GLUCAGON 3 MG/1
POWDER NASAL
Qty: 2 EACH | Refills: 3 | Status: SHIPPED | OUTPATIENT
Start: 2025-08-06

## 2025-08-21 PROCEDURE — RXMED WILLOW AMBULATORY MEDICATION CHARGE

## 2025-08-25 ENCOUNTER — APPOINTMENT (OUTPATIENT)
Dept: PEDIATRIC ENDOCRINOLOGY | Facility: CLINIC | Age: 20
End: 2025-08-25
Payer: COMMERCIAL

## 2025-08-25 VITALS
WEIGHT: 255.95 LBS | BODY MASS INDEX: 36.64 KG/M2 | RESPIRATION RATE: 18 BRPM | DIASTOLIC BLOOD PRESSURE: 73 MMHG | SYSTOLIC BLOOD PRESSURE: 105 MMHG | HEART RATE: 93 BPM | TEMPERATURE: 97.7 F | HEIGHT: 70 IN | OXYGEN SATURATION: 98 %

## 2025-08-25 DIAGNOSIS — E11.9 TYPE 2 DIABETES MELLITUS WITHOUT COMPLICATION, WITH LONG-TERM CURRENT USE OF INSULIN: Primary | ICD-10-CM

## 2025-08-25 DIAGNOSIS — Z79.4 TYPE 2 DIABETES MELLITUS WITHOUT COMPLICATION, WITH LONG-TERM CURRENT USE OF INSULIN: Primary | ICD-10-CM

## 2025-08-25 LAB — POC HEMOGLOBIN A1C: 7.8 % (ref 4.2–6.5)

## 2025-08-25 PROCEDURE — 3078F DIAST BP <80 MM HG: CPT

## 2025-08-25 PROCEDURE — 1036F TOBACCO NON-USER: CPT

## 2025-08-25 PROCEDURE — 83036 HEMOGLOBIN GLYCOSYLATED A1C: CPT

## 2025-08-25 PROCEDURE — 3074F SYST BP LT 130 MM HG: CPT

## 2025-08-25 PROCEDURE — RXMED WILLOW AMBULATORY MEDICATION CHARGE

## 2025-08-25 PROCEDURE — 3008F BODY MASS INDEX DOCD: CPT

## 2025-08-25 PROCEDURE — 3051F HG A1C>EQUAL 7.0%<8.0%: CPT

## 2025-08-25 PROCEDURE — 99214 OFFICE O/P EST MOD 30 MIN: CPT

## 2025-08-25 ASSESSMENT — ENCOUNTER SYMPTOMS
DIARRHEA: 0
POLYPHAGIA: 0
ACTIVITY CHANGE: 0
HEADACHES: 0
ABDOMINAL PAIN: 0
APPETITE CHANGE: 0
NAUSEA: 0
POLYDIPSIA: 0
VOMITING: 0
CONSTIPATION: 0

## 2025-08-25 ASSESSMENT — PATIENT HEALTH QUESTIONNAIRE - PHQ9
1. LITTLE INTEREST OR PLEASURE IN DOING THINGS: NOT AT ALL
2. FEELING DOWN, DEPRESSED OR HOPELESS: NOT AT ALL
SUM OF ALL RESPONSES TO PHQ9 QUESTIONS 1 & 2: 0

## 2025-08-27 ENCOUNTER — PHARMACY VISIT (OUTPATIENT)
Dept: PHARMACY | Facility: CLINIC | Age: 20
End: 2025-08-27
Payer: COMMERCIAL

## 2025-08-29 PROCEDURE — RXMED WILLOW AMBULATORY MEDICATION CHARGE

## 2025-09-03 ENCOUNTER — PHARMACY VISIT (OUTPATIENT)
Dept: PHARMACY | Facility: CLINIC | Age: 20
End: 2025-09-03
Payer: COMMERCIAL

## 2025-12-01 ENCOUNTER — APPOINTMENT (OUTPATIENT)
Dept: PEDIATRIC ENDOCRINOLOGY | Facility: CLINIC | Age: 20
End: 2025-12-01
Payer: COMMERCIAL

## 2026-04-30 ENCOUNTER — APPOINTMENT (OUTPATIENT)
Dept: OPHTHALMOLOGY | Facility: CLINIC | Age: 21
End: 2026-04-30
Payer: COMMERCIAL